# Patient Record
Sex: FEMALE
[De-identification: names, ages, dates, MRNs, and addresses within clinical notes are randomized per-mention and may not be internally consistent; named-entity substitution may affect disease eponyms.]

---

## 2020-11-15 ENCOUNTER — HOSPITAL ENCOUNTER (EMERGENCY)
Dept: HOSPITAL 26 - MED | Age: 40
LOS: 1 days | Discharge: HOME | End: 2020-11-16
Payer: SELF-PAY

## 2020-11-15 VITALS — SYSTOLIC BLOOD PRESSURE: 129 MMHG | DIASTOLIC BLOOD PRESSURE: 69 MMHG

## 2020-11-15 VITALS — BODY MASS INDEX: 31.92 KG/M2 | HEIGHT: 64 IN | WEIGHT: 187 LBS

## 2020-11-15 DIAGNOSIS — U07.1: Primary | ICD-10-CM

## 2020-11-15 DIAGNOSIS — J20.9: ICD-10-CM

## 2020-11-16 NOTE — NUR
Patient discharged with v/s stable. Written and verbal after care instructions 
given and explained. 

Patient alert, oriented and verbalized understanding of instructions. 
Ambulatory with steady gait. All questions addressed prior to discharge. ID 
band removed. Patient advised to follow up with PMD. Rx of naprosyn, 
acetaminophen,guaiatussin given. Patient educated on indication of medication 
including possible reaction and side effects. Opportunity to ask questions 
provided and answered.